# Patient Record
Sex: MALE | Race: WHITE | NOT HISPANIC OR LATINO | Employment: FULL TIME | URBAN - METROPOLITAN AREA
[De-identification: names, ages, dates, MRNs, and addresses within clinical notes are randomized per-mention and may not be internally consistent; named-entity substitution may affect disease eponyms.]

---

## 2023-02-06 ENCOUNTER — HOSPITAL ENCOUNTER (EMERGENCY)
Facility: HOSPITAL | Age: 28
Discharge: HOME | End: 2023-02-06
Attending: EMERGENCY MEDICINE | Admitting: EMERGENCY MEDICINE
Payer: COMMERCIAL

## 2023-02-06 VITALS
OXYGEN SATURATION: 100 % | DIASTOLIC BLOOD PRESSURE: 85 MMHG | HEART RATE: 85 BPM | WEIGHT: 175 LBS | BODY MASS INDEX: 27.47 KG/M2 | SYSTOLIC BLOOD PRESSURE: 121 MMHG | RESPIRATION RATE: 18 BRPM | HEIGHT: 67 IN | TEMPERATURE: 96.6 F

## 2023-02-06 DIAGNOSIS — R31.0 INTERMITTENT GROSS HEMATURIA: Primary | ICD-10-CM

## 2023-02-06 LAB
ALBUMIN SERPL-MCNC: 4.6 G/DL (ref 3.5–5.2)
ALBUMIN/GLOB SERPL: 2.4 G/DL
ALP SERPL-CCNC: 76 U/L (ref 39–117)
ALT SERPL W P-5'-P-CCNC: 49 U/L (ref 1–41)
ANION GAP SERPL CALCULATED.3IONS-SCNC: 9 MMOL/L (ref 5–15)
AST SERPL-CCNC: 35 U/L (ref 1–40)
BASOPHILS # BLD AUTO: 0.05 10*3/MM3 (ref 0–0.2)
BASOPHILS NFR BLD AUTO: 0.6 % (ref 0–1.5)
BILIRUB SERPL-MCNC: 0.3 MG/DL (ref 0–1.2)
BILIRUB UR QL STRIP: NEGATIVE
BUN SERPL-MCNC: 19 MG/DL (ref 6–20)
BUN/CREAT SERPL: 12.8 (ref 7–25)
CALCIUM SPEC-SCNC: 9.6 MG/DL (ref 8.6–10.5)
CHLORIDE SERPL-SCNC: 104 MMOL/L (ref 98–107)
CLARITY UR: CLEAR
CO2 SERPL-SCNC: 28 MMOL/L (ref 22–29)
COLOR UR: YELLOW
CREAT SERPL-MCNC: 1.48 MG/DL (ref 0.76–1.27)
DEPRECATED RDW RBC AUTO: 40.5 FL (ref 37–54)
EGFRCR SERPLBLD CKD-EPI 2021: 66.5 ML/MIN/1.73
EOSINOPHIL # BLD AUTO: 0.22 10*3/MM3 (ref 0–0.4)
EOSINOPHIL NFR BLD AUTO: 2.7 % (ref 0.3–6.2)
ERYTHROCYTE [DISTWIDTH] IN BLOOD BY AUTOMATED COUNT: 12.3 % (ref 12.3–15.4)
GLOBULIN UR ELPH-MCNC: 1.9 GM/DL
GLUCOSE SERPL-MCNC: 95 MG/DL (ref 65–99)
GLUCOSE UR STRIP-MCNC: NEGATIVE MG/DL
HCT VFR BLD AUTO: 41.5 % (ref 37.5–51)
HGB BLD-MCNC: 13.8 G/DL (ref 13–17.7)
HGB UR QL STRIP.AUTO: NEGATIVE
IMM GRANULOCYTES # BLD AUTO: 0.02 10*3/MM3 (ref 0–0.05)
IMM GRANULOCYTES NFR BLD AUTO: 0.2 % (ref 0–0.5)
KETONES UR QL STRIP: NEGATIVE
LEUKOCYTE ESTERASE UR QL STRIP.AUTO: NEGATIVE
LYMPHOCYTES # BLD AUTO: 2.4 10*3/MM3 (ref 0.7–3.1)
LYMPHOCYTES NFR BLD AUTO: 29.7 % (ref 19.6–45.3)
MCH RBC QN AUTO: 30.2 PG (ref 26.6–33)
MCHC RBC AUTO-ENTMCNC: 33.3 G/DL (ref 31.5–35.7)
MCV RBC AUTO: 90.8 FL (ref 79–97)
MONOCYTES # BLD AUTO: 0.58 10*3/MM3 (ref 0.1–0.9)
MONOCYTES NFR BLD AUTO: 7.2 % (ref 5–12)
NEUTROPHILS NFR BLD AUTO: 4.81 10*3/MM3 (ref 1.7–7)
NEUTROPHILS NFR BLD AUTO: 59.6 % (ref 42.7–76)
NITRITE UR QL STRIP: NEGATIVE
NRBC BLD AUTO-RTO: 0 /100 WBC (ref 0–0.2)
PH UR STRIP.AUTO: 6 [PH] (ref 5–8)
PLATELET # BLD AUTO: 269 10*3/MM3 (ref 140–450)
PMV BLD AUTO: 10.4 FL (ref 6–12)
POTASSIUM SERPL-SCNC: 3.9 MMOL/L (ref 3.5–5.2)
PROT SERPL-MCNC: 6.5 G/DL (ref 6–8.5)
PROT UR QL STRIP: NEGATIVE
RBC # BLD AUTO: 4.57 10*6/MM3 (ref 4.14–5.8)
SODIUM SERPL-SCNC: 141 MMOL/L (ref 136–145)
SP GR UR STRIP: 1.02 (ref 1–1.03)
UROBILINOGEN UR QL STRIP: NORMAL
WBC NRBC COR # BLD: 8.08 10*3/MM3 (ref 3.4–10.8)

## 2023-02-06 PROCEDURE — 99283 EMERGENCY DEPT VISIT LOW MDM: CPT

## 2023-02-06 PROCEDURE — 85025 COMPLETE CBC W/AUTO DIFF WBC: CPT | Performed by: EMERGENCY MEDICINE

## 2023-02-06 PROCEDURE — 81003 URINALYSIS AUTO W/O SCOPE: CPT | Performed by: EMERGENCY MEDICINE

## 2023-02-06 PROCEDURE — 80053 COMPREHEN METABOLIC PANEL: CPT | Performed by: EMERGENCY MEDICINE

## 2023-02-07 NOTE — ED TRIAGE NOTES
Patient to ED per PV from home w/ reports of blood in urine x4 over the last few months. Patient denies dysuria, flank pain, fevers.    Patient and staff w/ appropriate PPE in place throughout encounter.

## 2023-02-07 NOTE — ED PROVIDER NOTES
EMERGENCY DEPARTMENT ENCOUNTER    Room Number:  34/34  Date seen:  2/6/2023  PCP: Provider, No Known  Historian: Patient      HPI:  Chief Complaint: Episodic, painless hematuria    A complete HPI/ROS/PMH/PSH/SH/FH are unobtainable due to: N/A    Context: Michael Vivar is a 27 y.o. male who presents to the ED c/o episode of painless hematuria.  He has had 3 episodes over the past year, this most recent episode was a few days ago.  He states he will urinate debora right red blood, then his next urination will be a little bit darker and then his urine will eventually clear up.  He does not have any flank pain, abdominal pain, testicular pain, nausea, vomiting, diarrhea or fevers.  He has no concerns for STDs.    Additional sources:  - Discussed/ obtained information from independent historians: N/A    - External (non-ED) record review: He is very healthy.  He exercises routinely and takes preworkout supplements.  He also occasionally takes creatine.    - Chronic or social conditions impacting care: None of which I am aware.      PAST MEDICAL HISTORY  Active Ambulatory Problems     Diagnosis Date Noted   • No Active Ambulatory Problems     Resolved Ambulatory Problems     Diagnosis Date Noted   • No Resolved Ambulatory Problems     No Additional Past Medical History         PAST SURGICAL HISTORY  No past surgical history on file.      FAMILY HISTORY  No family history on file.      SOCIAL HISTORY  Social History     Socioeconomic History   • Marital status:          ALLERGIES  Patient has no known allergies.        REVIEW OF SYSTEMS  Review of Systems   Constitutional: Negative for fever.   Gastrointestinal: Negative for abdominal pain, blood in stool, diarrhea, nausea and vomiting.   Genitourinary: Positive for frequency and hematuria. Negative for difficulty urinating, dysuria, flank pain, penile discharge, penile pain, penile swelling, scrotal swelling, testicular pain and urgency.            PHYSICAL EXAM  ED  Triage Vitals   Temp Heart Rate Resp BP SpO2   02/06/23 1940 02/06/23 1940 02/06/23 1940 02/06/23 1943 02/06/23 1940   96.6 °F (35.9 °C) 68 18 178/91 100 %      Temp src Heart Rate Source Patient Position BP Location FiO2 (%)   02/06/23 1940 02/06/23 1940 02/06/23 1943 02/06/23 1943 --   Tympanic Monitor Standing Right arm        Physical Exam      Physical Exam   Constitutional: Pt. is oriented to person, place, and time.  He is well-developed, well-nourished, and in no distress.    HENT: Normocephalic and atraumatic. Pupils are equal, round, and reactive to light.   Neck: Normal range of motion. Neck supple. No JVD present. No tracheal deviation present.   Cardiovascular: Normal rate, regular rhythm and normal heart sounds.   Pulmonary/Chest: Effort normal and breath sounds normal. No stridor. No respiratory distress. No wheezes, no rales.   Abdominal: Soft.  No distension. There is no tenderness. There is no rebound and no guarding.   Musculoskeletal: No edema, tenderness or deformity.   Neurological: He is alert and oriented to person, place, and time.  He has no focal neurologic deficits.  Skin: Skin is warm and dry. Pt. is not diaphoretic.  Psychiatric: Mood, affect normal.  He is pleasant and cooperative.  Nursing note and vitals reviewed.            LAB RESULTS  Recent Results (from the past 24 hour(s))   Urinalysis With Microscopic If Indicated (No Culture) - Urine, Clean Catch    Collection Time: 02/06/23  7:47 PM    Specimen: Urine, Clean Catch   Result Value Ref Range    Color, UA Yellow Yellow, Straw    Appearance, UA Clear Clear    pH, UA 6.0 5.0 - 8.0    Specific Gravity, UA 1.020 1.005 - 1.030    Glucose, UA Negative Negative    Ketones, UA Negative Negative    Bilirubin, UA Negative Negative    Blood, UA Negative Negative    Protein, UA Negative Negative    Leuk Esterase, UA Negative Negative    Nitrite, UA Negative Negative    Urobilinogen, UA 1.0 E.U./dL 0.2 - 1.0 E.U./dL   Comprehensive Metabolic  Panel    Collection Time: 02/06/23  8:36 PM    Specimen: Blood   Result Value Ref Range    Glucose 95 65 - 99 mg/dL    BUN 19 6 - 20 mg/dL    Creatinine 1.48 (H) 0.76 - 1.27 mg/dL    Sodium 141 136 - 145 mmol/L    Potassium 3.9 3.5 - 5.2 mmol/L    Chloride 104 98 - 107 mmol/L    CO2 28.0 22.0 - 29.0 mmol/L    Calcium 9.6 8.6 - 10.5 mg/dL    Total Protein 6.5 6.0 - 8.5 g/dL    Albumin 4.6 3.5 - 5.2 g/dL    ALT (SGPT) 49 (H) 1 - 41 U/L    AST (SGOT) 35 1 - 40 U/L    Alkaline Phosphatase 76 39 - 117 U/L    Total Bilirubin 0.3 0.0 - 1.2 mg/dL    Globulin 1.9 gm/dL    A/G Ratio 2.4 g/dL    BUN/Creatinine Ratio 12.8 7.0 - 25.0    Anion Gap 9.0 5.0 - 15.0 mmol/L    eGFR 66.5 >60.0 mL/min/1.73   CBC Auto Differential    Collection Time: 02/06/23  8:36 PM    Specimen: Blood   Result Value Ref Range    WBC 8.08 3.40 - 10.80 10*3/mm3    RBC 4.57 4.14 - 5.80 10*6/mm3    Hemoglobin 13.8 13.0 - 17.7 g/dL    Hematocrit 41.5 37.5 - 51.0 %    MCV 90.8 79.0 - 97.0 fL    MCH 30.2 26.6 - 33.0 pg    MCHC 33.3 31.5 - 35.7 g/dL    RDW 12.3 12.3 - 15.4 %    RDW-SD 40.5 37.0 - 54.0 fl    MPV 10.4 6.0 - 12.0 fL    Platelets 269 140 - 450 10*3/mm3    Neutrophil % 59.6 42.7 - 76.0 %    Lymphocyte % 29.7 19.6 - 45.3 %    Monocyte % 7.2 5.0 - 12.0 %    Eosinophil % 2.7 0.3 - 6.2 %    Basophil % 0.6 0.0 - 1.5 %    Immature Grans % 0.2 0.0 - 0.5 %    Neutrophils, Absolute 4.81 1.70 - 7.00 10*3/mm3    Lymphocytes, Absolute 2.40 0.70 - 3.10 10*3/mm3    Monocytes, Absolute 0.58 0.10 - 0.90 10*3/mm3    Eosinophils, Absolute 0.22 0.00 - 0.40 10*3/mm3    Basophils, Absolute 0.05 0.00 - 0.20 10*3/mm3    Immature Grans, Absolute 0.02 0.00 - 0.05 10*3/mm3    nRBC 0.0 0.0 - 0.2 /100 WBC       Ordered the above labs and reviewed the results.        RADIOLOGY  No Radiology Exams Resulted Within Past 24 Hours    Ordered the above noted radiological studies. Reviewed by me in PACS.        PROCEDURES  Procedures      MEDICATIONS GIVEN IN ER  Medications - No  data to display          MEDICAL DECISION MAKING, PROGRESS, and CONSULTS    All labs have been independently reviewed by me.  All radiology studies have been reviewed by me and discussed with radiologist dictating the report.   EKG's independently viewed and interpreted by me.  Discussion below represents my analysis of pertinent findings related to patient's condition, differential diagnosis, treatment plan and final disposition.      Orders placed during this visit:  Orders Placed This Encounter   Procedures   • Urinalysis With Microscopic If Indicated (No Culture) - Urine, Clean Catch   • Comprehensive Metabolic Panel   • CBC Auto Differential   • CBC & Differential       Additional orders considered but not ordered:  N/A    Differential diagnosis:  Differential diagnosis for hematuria includes but is not limited to:  - BPH  - hereditary hematuria  - DIC  - coagulopathy  - infection  - infarction  - instrumentation  - tumor  - trauma  - transient hematuria  - ureteral stones  - sickle cell  - hemorrhagic cystitis      Independent interpretation of labs, radiology studies, and discussions with consultants:  ED Course as of 02/06/23 2114 Mon Feb 06, 2023 2107 Creatinine(!): 1.48 [WC]   2107 CBC and CMP are otherwise unremarkable. [WC]      ED Course User Index  [WC] Michael Flores MD              Appropriate PPE was worn by myself and the patient throughout our entire interaction.    DIAGNOSIS  Final diagnoses:   Intermittent gross hematuria         DISPOSITION  Discharged            Latest Documented Vital Signs:  As of 21:14 EST  BP- 178/91 HR- 68 Temp- 96.6 °F (35.9 °C) (Tympanic) O2 sat- 100%        --    Please note that portions of this were completed with a voice recognition program.       Note Disclaimer: At [facility], we believe that sharing information builds trust and better relationships. You are receiving this note because you are receiving care at [facility] or recently visited. It is  possible you will see health information before a provider has talked with you about it. This kind of information can be easy to misunderstand. To help you fully understand what it means for your health, we urge you to discuss this note with your provider.           Michael Flores MD  02/06/23 [id number]

## 2023-02-07 NOTE — ED NOTES
Pt c/o blood in urine intermittently for several months. Today pt states that today his urine was completely blood. Pt denies any pain with urination or flank pain. Denies fever, n/v. Pt states that each time it has happened he worked out lifting weights. Pt also takes supplements for workouts    This RN wore mask and goggles during time of contact